# Patient Record
Sex: MALE
[De-identification: names, ages, dates, MRNs, and addresses within clinical notes are randomized per-mention and may not be internally consistent; named-entity substitution may affect disease eponyms.]

---

## 2021-03-18 DIAGNOSIS — Z01.818 ENCOUNTER FOR OTHER PREPROCEDURAL EXAMINATION: ICD-10-CM

## 2021-03-19 ENCOUNTER — APPOINTMENT (OUTPATIENT)
Dept: DISASTER EMERGENCY | Facility: CLINIC | Age: 61
End: 2021-03-19

## 2021-04-16 ENCOUNTER — APPOINTMENT (OUTPATIENT)
Dept: DISASTER EMERGENCY | Facility: CLINIC | Age: 61
End: 2021-04-16

## 2021-04-16 DIAGNOSIS — Z01.818 ENCOUNTER FOR OTHER PREPROCEDURAL EXAMINATION: ICD-10-CM

## 2021-04-17 LAB — SARS-COV-2 N GENE NPH QL NAA+PROBE: NOT DETECTED

## 2022-04-18 ENCOUNTER — FORM ENCOUNTER (OUTPATIENT)
Age: 62
End: 2022-04-18

## 2022-06-08 ENCOUNTER — APPOINTMENT (OUTPATIENT)
Dept: ORTHOPEDIC SURGERY | Facility: CLINIC | Age: 62
End: 2022-06-08
Payer: COMMERCIAL

## 2022-06-08 VITALS — WEIGHT: 195 LBS | BODY MASS INDEX: 26.41 KG/M2 | HEIGHT: 72 IN

## 2022-06-08 PROCEDURE — 99072 ADDL SUPL MATRL&STAF TM PHE: CPT

## 2022-06-08 PROCEDURE — 99214 OFFICE O/P EST MOD 30 MIN: CPT

## 2022-06-08 RX ORDER — GABAPENTIN 300 MG/1
300 CAPSULE ORAL
Qty: 90 | Refills: 1 | Status: ACTIVE | COMMUNITY
Start: 2022-06-08 | End: 1900-01-01

## 2022-06-08 NOTE — ASSESSMENT
[FreeTextEntry1] : Multi-level foraminal narrowing on MRI\par No Tspine fractures\par C/w meds tramadol and gabapentin, PT\par Discussed operative intervention - ACDF 3 levels, will send to my partner for discussion as well\par \par \par Gabapentin- Patient advised of sedating effects, instructed not to drive, operate machinery, or take with other sedating medications. Advised of need to taper on/off medication and risk of abruptly stopping gabapentin.

## 2022-06-08 NOTE — IMAGING
[de-identified] : Right Hand: Special testing of the hand/wrist is as follows: Equivocal compression testing\par Left Hand: Special testing of the hand/wrist is as follows: equivocal compression testing \par Neck: \par Inspection of the cervical spine is as follows: no ecchymosis. Palpation of the cervical spine is as follows: bilateral\par trapezial tenderness, left rhomboid tenderness and bilateral paracervical tenderness. Range of motion of the\par cervical spine is as follows: diminished range of motion in all planes Strength Testing for the cervical spine is\par as follows: Left Wrist Flexors 4/5. Left Grasp 4/5. Left Deltoid strength 5/5, Right Deltoid strength 5/5, Left Biceps\par 5/5, Right Biceps 5/5, Left Triceps 5/5, Right Triceps 5/5, Right Wrist Flexors 5/5, Left Finger Abductors 5/5, Right Finger\par Abductors 5/5 and Right Grasp 5/5 4/5 L WF, WE Neurological testing for the cervical spine is as\par follows: Sensation of the LEFT upper extremity is altered. negative Boyer reflex reports altered sensation to light\par tough in all 5 digits \par X-Ray Examination of the CERVICAL SPINE 4 views shows Facet arthropathy, Disc space narrowing and No\par instability seen on flexion/extension DDD C5/6, C6/7
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
## 2022-07-05 ENCOUNTER — APPOINTMENT (OUTPATIENT)
Dept: ORTHOPEDIC SURGERY | Facility: CLINIC | Age: 62
End: 2022-07-05

## 2022-07-06 ENCOUNTER — APPOINTMENT (OUTPATIENT)
Dept: ORTHOPEDIC SURGERY | Facility: CLINIC | Age: 62
End: 2022-07-06

## 2023-01-19 ENCOUNTER — FORM ENCOUNTER (OUTPATIENT)
Age: 63
End: 2023-01-19

## 2023-05-30 ENCOUNTER — APPOINTMENT (OUTPATIENT)
Dept: ORTHOPEDIC SURGERY | Facility: CLINIC | Age: 63
End: 2023-05-30
Payer: COMMERCIAL

## 2023-05-30 DIAGNOSIS — M54.2 CERVICALGIA: ICD-10-CM

## 2023-05-30 DIAGNOSIS — S23.9XXA SPRAIN OF UNSPECIFIED PARTS OF THORAX, INITIAL ENCOUNTER: ICD-10-CM

## 2023-05-30 DIAGNOSIS — M62.838 OTHER MUSCLE SPASM: ICD-10-CM

## 2023-05-30 DIAGNOSIS — M54.12 RADICULOPATHY, CERVICAL REGION: ICD-10-CM

## 2023-05-30 PROCEDURE — 99214 OFFICE O/P EST MOD 30 MIN: CPT
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
## 2023-05-30 NOTE — ASSESSMENT
[FreeTextEntry1] : Multi-level foraminal narrowing on MRI\par No Tspine fractures\par C/w meds tramadol and gabapentin, PT\par Discussed operative intervention - ACDF 3 levels, will send to my partner for discussion as well\par \par \par Gabapentin- Patient advised of sedating effects, instructed not to drive, operate machinery, or take with other sedating medications. Advised of need to taper on/off medication and risk of abruptly stopping gabapentin. \par \par \par Patient seen by Chandni Crum PA-C,  under the supervision of  Dr. John Ibarra M.D.\par

## 2023-05-30 NOTE — IMAGING
[de-identified] : Right Hand: Special testing of the hand/wrist is as follows: Equivocal compression testing\par Left Hand: Special testing of the hand/wrist is as follows: equivocal compression testing \par Neck: \par Inspection of the cervical spine is as follows: no ecchymosis. Palpation of the cervical spine is as follows: left \par trapezial tenderness, bilateral rhomboid tenderness. Range of motion of the\par cervical spine is as follows: diminished range of motion in all planes Strength Testing for the cervical spine is\par as follows: Left Wrist Flexors 4/5. Left Grasp 4/5. Left Deltoid strength 5/5, Right Deltoid strength 5/5, Left Biceps\par 5/5, Right Biceps 5/5, Left Triceps 5/5, Right Triceps 5/5, Right Wrist Flexors 5/5, Left Finger Abductors 5/5, Right Finger\par Abductors 5/5 and Right Grasp 5/5 4/5 L WF, WE Neurological testing for the cervical spine is as\par follows: Sensation of the LEFT upper extremity is altered. negative Boyer reflex reports altered sensation to light\par tough in all 5 digits \par

## 2023-07-05 ENCOUNTER — FORM ENCOUNTER (OUTPATIENT)
Age: 63
End: 2023-07-05

## 2023-07-06 ENCOUNTER — FORM ENCOUNTER (OUTPATIENT)
Age: 63
End: 2023-07-06

## 2023-08-02 RX ORDER — TRAMADOL HYDROCHLORIDE 50 MG/1
50 TABLET, COATED ORAL
Qty: 14 | Refills: 0 | Status: ACTIVE | COMMUNITY
Start: 2023-05-30 | End: 1900-01-01

## 2023-09-21 ENCOUNTER — OFFICE (OUTPATIENT)
Dept: URBAN - METROPOLITAN AREA CLINIC 77 | Facility: CLINIC | Age: 63
Setting detail: OPHTHALMOLOGY
End: 2023-09-21

## 2023-09-21 PROCEDURE — 33333 MEDICAL RECORDS FEE: CPT | Performed by: OPHTHALMOLOGY

## 2023-11-20 ENCOUNTER — NON-APPOINTMENT (OUTPATIENT)
Age: 63
End: 2023-11-20

## 2023-12-07 ENCOUNTER — OFFICE (OUTPATIENT)
Dept: URBAN - METROPOLITAN AREA CLINIC 29 | Facility: CLINIC | Age: 63
Setting detail: OPHTHALMOLOGY
End: 2023-12-07

## 2023-12-07 PROCEDURE — 33333 MEDICAL RECORDS FEE: CPT | Performed by: OPHTHALMOLOGY
